# Patient Record
Sex: FEMALE | Race: WHITE | Employment: UNEMPLOYED | URBAN - METROPOLITAN AREA
[De-identification: names, ages, dates, MRNs, and addresses within clinical notes are randomized per-mention and may not be internally consistent; named-entity substitution may affect disease eponyms.]

---

## 2019-06-06 ENCOUNTER — TRANSFERRED RECORDS (OUTPATIENT)
Dept: HEALTH INFORMATION MANAGEMENT | Facility: CLINIC | Age: 39
End: 2019-06-06

## 2019-06-06 ENCOUNTER — HOSPITAL ENCOUNTER (INPATIENT)
Facility: CLINIC | Age: 39
LOS: 3 days | Discharge: HOME | End: 2019-06-09
Attending: SURGERY | Admitting: SURGERY
Payer: COMMERCIAL

## 2019-06-06 ENCOUNTER — ANESTHESIA EVENT (OUTPATIENT)
Dept: SURGERY | Facility: CLINIC | Age: 39
End: 2019-06-06
Payer: COMMERCIAL

## 2019-06-06 ENCOUNTER — ANESTHESIA (OUTPATIENT)
Dept: SURGERY | Facility: CLINIC | Age: 39
End: 2019-06-06
Payer: COMMERCIAL

## 2019-06-06 DIAGNOSIS — K35.32 ACUTE PERFORATED APPENDICITIS: Primary | ICD-10-CM

## 2019-06-06 LAB
ALT SERPL-CCNC: 28 U/L
AST SERPL-CCNC: 26 IU/L (ref 15–46)
CREAT SERPL-MCNC: 0.6 MG/DL (ref 0.52–1.04)
GFR SERPL CREATININE-BSD FRML MDRD: >60 ML/MIN/1.73M2
GLUCOSE SERPL-MCNC: 119 MG/DL (ref 74–106)
HCG UR QL: NEGATIVE
POTASSIUM SERPL-SCNC: 3.7 MMOL/L (ref 3.5–5.1)

## 2019-06-06 PROCEDURE — 36000056 ZZH SURGERY LEVEL 3 1ST 30 MIN: Performed by: SURGERY

## 2019-06-06 PROCEDURE — 88304 TISSUE EXAM BY PATHOLOGIST: CPT | Performed by: SURGERY

## 2019-06-06 PROCEDURE — 40000306 ZZH STATISTIC PRE PROC ASSESS II: Performed by: SURGERY

## 2019-06-06 PROCEDURE — 25000125 ZZHC RX 250: Performed by: NURSE ANESTHETIST, CERTIFIED REGISTERED

## 2019-06-06 PROCEDURE — 25000125 ZZHC RX 250: Performed by: SURGERY

## 2019-06-06 PROCEDURE — 12000013 ZZH R&B PEDS

## 2019-06-06 PROCEDURE — 81025 URINE PREGNANCY TEST: CPT | Performed by: ANESTHESIOLOGY

## 2019-06-06 PROCEDURE — 27210794 ZZH OR GENERAL SUPPLY STERILE: Performed by: SURGERY

## 2019-06-06 PROCEDURE — 58662 LAPAROSCOPY EXCISE LESIONS: CPT | Performed by: SURGERY

## 2019-06-06 PROCEDURE — 0U974ZZ DRAINAGE OF BILATERAL FALLOPIAN TUBES, PERCUTANEOUS ENDOSCOPIC APPROACH: ICD-10-PCS | Performed by: SURGERY

## 2019-06-06 PROCEDURE — 37000008 ZZH ANESTHESIA TECHNICAL FEE, 1ST 30 MIN: Performed by: SURGERY

## 2019-06-06 PROCEDURE — 25800025 ZZH RX 258: Performed by: SURGERY

## 2019-06-06 PROCEDURE — 71000012 ZZH RECOVERY PHASE 1 LEVEL 1 FIRST HR: Performed by: SURGERY

## 2019-06-06 PROCEDURE — 37000009 ZZH ANESTHESIA TECHNICAL FEE, EACH ADDTL 15 MIN: Performed by: SURGERY

## 2019-06-06 PROCEDURE — 25000128 H RX IP 250 OP 636: Performed by: SURGERY

## 2019-06-06 PROCEDURE — 0DTJ4ZZ RESECTION OF APPENDIX, PERCUTANEOUS ENDOSCOPIC APPROACH: ICD-10-PCS | Performed by: SURGERY

## 2019-06-06 PROCEDURE — 44970 LAPAROSCOPY APPENDECTOMY: CPT | Mod: 59 | Performed by: SURGERY

## 2019-06-06 PROCEDURE — 36000058 ZZH SURGERY LEVEL 3 EA 15 ADDTL MIN: Performed by: SURGERY

## 2019-06-06 PROCEDURE — 88304 TISSUE EXAM BY PATHOLOGIST: CPT | Mod: 26 | Performed by: SURGERY

## 2019-06-06 PROCEDURE — 25800030 ZZH RX IP 258 OP 636: Performed by: NURSE ANESTHETIST, CERTIFIED REGISTERED

## 2019-06-06 PROCEDURE — 25800030 ZZH RX IP 258 OP 636: Performed by: ANESTHESIOLOGY

## 2019-06-06 PROCEDURE — 25000128 H RX IP 250 OP 636: Performed by: NURSE ANESTHETIST, CERTIFIED REGISTERED

## 2019-06-06 PROCEDURE — 99221 1ST HOSP IP/OBS SF/LOW 40: CPT | Mod: 57 | Performed by: SURGERY

## 2019-06-06 RX ORDER — ONDANSETRON 4 MG/1
4 TABLET, ORALLY DISINTEGRATING ORAL EVERY 30 MIN PRN
Status: DISCONTINUED | OUTPATIENT
Start: 2019-06-06 | End: 2019-06-07 | Stop reason: HOSPADM

## 2019-06-06 RX ORDER — ONDANSETRON 2 MG/ML
INJECTION INTRAMUSCULAR; INTRAVENOUS PRN
Status: DISCONTINUED | OUTPATIENT
Start: 2019-06-06 | End: 2019-06-06

## 2019-06-06 RX ORDER — CEFOTETAN DISODIUM 2 G/20ML
2 INJECTION, POWDER, FOR SOLUTION INTRAMUSCULAR; INTRAVENOUS ONCE
Status: COMPLETED | OUTPATIENT
Start: 2019-06-06 | End: 2019-06-06

## 2019-06-06 RX ORDER — KETOROLAC TROMETHAMINE 30 MG/ML
INJECTION, SOLUTION INTRAMUSCULAR; INTRAVENOUS PRN
Status: DISCONTINUED | OUTPATIENT
Start: 2019-06-06 | End: 2019-06-06

## 2019-06-06 RX ORDER — LABETALOL 20 MG/4 ML (5 MG/ML) INTRAVENOUS SYRINGE
10
Status: DISCONTINUED | OUTPATIENT
Start: 2019-06-06 | End: 2019-06-07 | Stop reason: HOSPADM

## 2019-06-06 RX ORDER — NEOSTIGMINE METHYLSULFATE 1 MG/ML
VIAL (ML) INJECTION PRN
Status: DISCONTINUED | OUTPATIENT
Start: 2019-06-06 | End: 2019-06-06

## 2019-06-06 RX ORDER — HYDROMORPHONE HYDROCHLORIDE 1 MG/ML
.3-.5 INJECTION, SOLUTION INTRAMUSCULAR; INTRAVENOUS; SUBCUTANEOUS EVERY 5 MIN PRN
Status: DISCONTINUED | OUTPATIENT
Start: 2019-06-06 | End: 2019-06-07 | Stop reason: HOSPADM

## 2019-06-06 RX ORDER — SODIUM CHLORIDE, SODIUM LACTATE, POTASSIUM CHLORIDE, CALCIUM CHLORIDE 600; 310; 30; 20 MG/100ML; MG/100ML; MG/100ML; MG/100ML
500 INJECTION, SOLUTION INTRAVENOUS CONTINUOUS
Status: DISCONTINUED | OUTPATIENT
Start: 2019-06-06 | End: 2019-06-06 | Stop reason: HOSPADM

## 2019-06-06 RX ORDER — NALOXONE HYDROCHLORIDE 0.4 MG/ML
.1-.4 INJECTION, SOLUTION INTRAMUSCULAR; INTRAVENOUS; SUBCUTANEOUS
Status: CANCELLED | OUTPATIENT
Start: 2019-06-06 | End: 2019-06-07

## 2019-06-06 RX ORDER — MEPERIDINE HYDROCHLORIDE 50 MG/ML
12.5 INJECTION INTRAMUSCULAR; INTRAVENOUS; SUBCUTANEOUS EVERY 5 MIN PRN
Status: DISCONTINUED | OUTPATIENT
Start: 2019-06-06 | End: 2019-06-07 | Stop reason: HOSPADM

## 2019-06-06 RX ORDER — GLYCOPYRROLATE 0.2 MG/ML
INJECTION, SOLUTION INTRAMUSCULAR; INTRAVENOUS PRN
Status: DISCONTINUED | OUTPATIENT
Start: 2019-06-06 | End: 2019-06-06

## 2019-06-06 RX ORDER — FENTANYL CITRATE 50 UG/ML
25-50 INJECTION, SOLUTION INTRAMUSCULAR; INTRAVENOUS
Status: DISCONTINUED | OUTPATIENT
Start: 2019-06-06 | End: 2019-06-07 | Stop reason: HOSPADM

## 2019-06-06 RX ORDER — ONDANSETRON 2 MG/ML
4 INJECTION INTRAMUSCULAR; INTRAVENOUS EVERY 30 MIN PRN
Status: DISCONTINUED | OUTPATIENT
Start: 2019-06-06 | End: 2019-06-07 | Stop reason: HOSPADM

## 2019-06-06 RX ORDER — FENTANYL CITRATE 50 UG/ML
INJECTION, SOLUTION INTRAMUSCULAR; INTRAVENOUS PRN
Status: DISCONTINUED | OUTPATIENT
Start: 2019-06-06 | End: 2019-06-06

## 2019-06-06 RX ORDER — BUPIVACAINE HYDROCHLORIDE 5 MG/ML
INJECTION, SOLUTION EPIDURAL; INTRACAUDAL PRN
Status: DISCONTINUED | OUTPATIENT
Start: 2019-06-06 | End: 2019-06-07 | Stop reason: HOSPADM

## 2019-06-06 RX ORDER — PROPOFOL 10 MG/ML
INJECTION, EMULSION INTRAVENOUS PRN
Status: DISCONTINUED | OUTPATIENT
Start: 2019-06-06 | End: 2019-06-06

## 2019-06-06 RX ORDER — DIAZEPAM 10 MG/2ML
2.5 INJECTION, SOLUTION INTRAMUSCULAR; INTRAVENOUS
Status: DISCONTINUED | OUTPATIENT
Start: 2019-06-06 | End: 2019-06-07 | Stop reason: HOSPADM

## 2019-06-06 RX ORDER — LIDOCAINE HYDROCHLORIDE 10 MG/ML
INJECTION, SOLUTION INFILTRATION; PERINEURAL PRN
Status: DISCONTINUED | OUTPATIENT
Start: 2019-06-06 | End: 2019-06-06

## 2019-06-06 RX ORDER — DEXAMETHASONE SODIUM PHOSPHATE 4 MG/ML
INJECTION, SOLUTION INTRA-ARTICULAR; INTRALESIONAL; INTRAMUSCULAR; INTRAVENOUS; SOFT TISSUE PRN
Status: DISCONTINUED | OUTPATIENT
Start: 2019-06-06 | End: 2019-06-06

## 2019-06-06 RX ORDER — SODIUM CHLORIDE, SODIUM LACTATE, POTASSIUM CHLORIDE, CALCIUM CHLORIDE 600; 310; 30; 20 MG/100ML; MG/100ML; MG/100ML; MG/100ML
INJECTION, SOLUTION INTRAVENOUS CONTINUOUS
Status: DISCONTINUED | OUTPATIENT
Start: 2019-06-06 | End: 2019-06-07 | Stop reason: HOSPADM

## 2019-06-06 RX ORDER — ALBUTEROL SULFATE 0.83 MG/ML
2.5 SOLUTION RESPIRATORY (INHALATION) EVERY 4 HOURS PRN
Status: DISCONTINUED | OUTPATIENT
Start: 2019-06-06 | End: 2019-06-07 | Stop reason: HOSPADM

## 2019-06-06 RX ORDER — DIMENHYDRINATE 50 MG/ML
25 INJECTION, SOLUTION INTRAMUSCULAR; INTRAVENOUS
Status: DISCONTINUED | OUTPATIENT
Start: 2019-06-06 | End: 2019-06-07 | Stop reason: HOSPADM

## 2019-06-06 RX ORDER — HYDRALAZINE HYDROCHLORIDE 20 MG/ML
2.5-5 INJECTION INTRAMUSCULAR; INTRAVENOUS EVERY 10 MIN PRN
Status: DISCONTINUED | OUTPATIENT
Start: 2019-06-06 | End: 2019-06-07 | Stop reason: HOSPADM

## 2019-06-06 RX ADMIN — ONDANSETRON 4 MG: 2 INJECTION INTRAMUSCULAR; INTRAVENOUS at 22:00

## 2019-06-06 RX ADMIN — PHENYLEPHRINE HYDROCHLORIDE 100 MCG: 10 INJECTION INTRAVENOUS at 22:18

## 2019-06-06 RX ADMIN — CEFOTETAN DISODIUM 2 G: 2 INJECTION, POWDER, FOR SOLUTION INTRAMUSCULAR; INTRAVENOUS at 22:16

## 2019-06-06 RX ADMIN — KETOROLAC TROMETHAMINE 30 MG: 30 INJECTION, SOLUTION INTRAMUSCULAR at 22:00

## 2019-06-06 RX ADMIN — SODIUM CHLORIDE, SODIUM LACTATE, POTASSIUM CHLORIDE, CALCIUM CHLORIDE: 600; 310; 30; 20 INJECTION, SOLUTION INTRAVENOUS at 21:55

## 2019-06-06 RX ADMIN — DEXAMETHASONE SODIUM PHOSPHATE 8 MG: 4 INJECTION, SOLUTION INTRA-ARTICULAR; INTRALESIONAL; INTRAMUSCULAR; INTRAVENOUS; SOFT TISSUE at 22:00

## 2019-06-06 RX ADMIN — Medication 3 MG: at 22:51

## 2019-06-06 RX ADMIN — PHENYLEPHRINE HYDROCHLORIDE 200 MCG: 10 INJECTION INTRAVENOUS at 22:11

## 2019-06-06 RX ADMIN — HYDROMORPHONE HYDROCHLORIDE 1 MG: 1 INJECTION, SOLUTION INTRAMUSCULAR; INTRAVENOUS; SUBCUTANEOUS at 22:00

## 2019-06-06 RX ADMIN — GLYCOPYRROLATE 0.4 MG: 0.2 INJECTION, SOLUTION INTRAMUSCULAR; INTRAVENOUS at 22:51

## 2019-06-06 RX ADMIN — ROCURONIUM BROMIDE 30 MG: 10 INJECTION INTRAVENOUS at 22:00

## 2019-06-06 RX ADMIN — FENTANYL CITRATE 100 MCG: 50 INJECTION, SOLUTION INTRAMUSCULAR; INTRAVENOUS at 22:00

## 2019-06-06 RX ADMIN — LIDOCAINE HYDROCHLORIDE 50 MG: 10 INJECTION, SOLUTION INFILTRATION; PERINEURAL at 22:00

## 2019-06-06 RX ADMIN — GLYCOPYRROLATE 0.2 MG: 0.2 INJECTION, SOLUTION INTRAMUSCULAR; INTRAVENOUS at 22:00

## 2019-06-07 LAB
CREAT SERPL-MCNC: 0.64 MG/DL (ref 0.52–1.04)
GFR SERPL CREATININE-BSD FRML MDRD: >90 ML/MIN/{1.73_M2}
GLUCOSE SERPL-MCNC: 159 MG/DL (ref 70–99)
PLATELET # BLD AUTO: 191 10E9/L (ref 150–450)

## 2019-06-07 PROCEDURE — 25000132 ZZH RX MED GY IP 250 OP 250 PS 637: Performed by: PHYSICIAN ASSISTANT

## 2019-06-07 PROCEDURE — 25800030 ZZH RX IP 258 OP 636: Performed by: SURGERY

## 2019-06-07 PROCEDURE — 82947 ASSAY GLUCOSE BLOOD QUANT: CPT | Performed by: SURGERY

## 2019-06-07 PROCEDURE — 82565 ASSAY OF CREATININE: CPT | Performed by: SURGERY

## 2019-06-07 PROCEDURE — 12000013 ZZH R&B PEDS

## 2019-06-07 PROCEDURE — 25000132 ZZH RX MED GY IP 250 OP 250 PS 637: Performed by: SURGERY

## 2019-06-07 PROCEDURE — 85049 AUTOMATED PLATELET COUNT: CPT | Performed by: SURGERY

## 2019-06-07 PROCEDURE — 36415 COLL VENOUS BLD VENIPUNCTURE: CPT | Performed by: SURGERY

## 2019-06-07 PROCEDURE — 25000128 H RX IP 250 OP 636: Performed by: SURGERY

## 2019-06-07 RX ORDER — DIPHENHYDRAMINE HYDROCHLORIDE 50 MG/ML
25 INJECTION INTRAMUSCULAR; INTRAVENOUS EVERY 6 HOURS PRN
Status: DISCONTINUED | OUTPATIENT
Start: 2019-06-07 | End: 2019-06-09 | Stop reason: HOSPADM

## 2019-06-07 RX ORDER — HYDROMORPHONE HYDROCHLORIDE 1 MG/ML
.3-.5 INJECTION, SOLUTION INTRAMUSCULAR; INTRAVENOUS; SUBCUTANEOUS
Status: DISCONTINUED | OUTPATIENT
Start: 2019-06-07 | End: 2019-06-09 | Stop reason: HOSPADM

## 2019-06-07 RX ORDER — IBUPROFEN 600 MG/1
600 TABLET, FILM COATED ORAL EVERY 6 HOURS PRN
Status: DISCONTINUED | OUTPATIENT
Start: 2019-06-07 | End: 2019-06-09 | Stop reason: HOSPADM

## 2019-06-07 RX ORDER — DEXTROSE MONOHYDRATE, SODIUM CHLORIDE, AND POTASSIUM CHLORIDE 50; 1.49; 4.5 G/1000ML; G/1000ML; G/1000ML
INJECTION, SOLUTION INTRAVENOUS CONTINUOUS
Status: DISCONTINUED | OUTPATIENT
Start: 2019-06-07 | End: 2019-06-09 | Stop reason: HOSPADM

## 2019-06-07 RX ORDER — DIPHENHYDRAMINE HCL 25 MG
25 CAPSULE ORAL EVERY 6 HOURS PRN
Status: DISCONTINUED | OUTPATIENT
Start: 2019-06-07 | End: 2019-06-09 | Stop reason: HOSPADM

## 2019-06-07 RX ORDER — ONDANSETRON 4 MG/1
4 TABLET, ORALLY DISINTEGRATING ORAL EVERY 6 HOURS PRN
Status: DISCONTINUED | OUTPATIENT
Start: 2019-06-07 | End: 2019-06-09 | Stop reason: HOSPADM

## 2019-06-07 RX ORDER — ACETAMINOPHEN 325 MG/1
975 TABLET ORAL EVERY 6 HOURS PRN
Status: DISCONTINUED | OUTPATIENT
Start: 2019-06-07 | End: 2019-06-09 | Stop reason: HOSPADM

## 2019-06-07 RX ORDER — ACETAMINOPHEN 325 MG/1
975 TABLET ORAL EVERY 8 HOURS
Status: DISCONTINUED | OUTPATIENT
Start: 2019-06-07 | End: 2019-06-07

## 2019-06-07 RX ORDER — LIDOCAINE 40 MG/G
CREAM TOPICAL
Status: DISCONTINUED | OUTPATIENT
Start: 2019-06-07 | End: 2019-06-09 | Stop reason: HOSPADM

## 2019-06-07 RX ORDER — OXYCODONE HYDROCHLORIDE 5 MG/1
5-10 TABLET ORAL
Status: DISCONTINUED | OUTPATIENT
Start: 2019-06-07 | End: 2019-06-09 | Stop reason: HOSPADM

## 2019-06-07 RX ORDER — ACETAMINOPHEN 325 MG/1
650 TABLET ORAL EVERY 4 HOURS PRN
Status: DISCONTINUED | OUTPATIENT
Start: 2019-06-09 | End: 2019-06-07 | Stop reason: DRUGHIGH

## 2019-06-07 RX ORDER — NALOXONE HYDROCHLORIDE 0.4 MG/ML
.1-.4 INJECTION, SOLUTION INTRAMUSCULAR; INTRAVENOUS; SUBCUTANEOUS
Status: DISCONTINUED | OUTPATIENT
Start: 2019-06-07 | End: 2019-06-09 | Stop reason: HOSPADM

## 2019-06-07 RX ORDER — PROCHLORPERAZINE MALEATE 10 MG
10 TABLET ORAL EVERY 6 HOURS PRN
Status: DISCONTINUED | OUTPATIENT
Start: 2019-06-07 | End: 2019-06-09 | Stop reason: HOSPADM

## 2019-06-07 RX ORDER — ONDANSETRON 2 MG/ML
4 INJECTION INTRAMUSCULAR; INTRAVENOUS EVERY 6 HOURS PRN
Status: DISCONTINUED | OUTPATIENT
Start: 2019-06-07 | End: 2019-06-09 | Stop reason: HOSPADM

## 2019-06-07 RX ORDER — MULTIPLE VITAMINS W/ MINERALS TAB 9MG-400MCG
1 TAB ORAL DAILY
COMMUNITY

## 2019-06-07 RX ADMIN — IBUPROFEN 600 MG: 600 TABLET ORAL at 19:57

## 2019-06-07 RX ADMIN — POTASSIUM CHLORIDE, DEXTROSE MONOHYDRATE AND SODIUM CHLORIDE: 150; 5; 450 INJECTION, SOLUTION INTRAVENOUS at 12:47

## 2019-06-07 RX ADMIN — ACETAMINOPHEN 975 MG: 325 TABLET, FILM COATED ORAL at 01:23

## 2019-06-07 RX ADMIN — ACETAMINOPHEN 975 MG: 325 TABLET, FILM COATED ORAL at 08:54

## 2019-06-07 RX ADMIN — TAZOBACTAM SODIUM AND PIPERACILLIN SODIUM 3.38 G: 375; 3 INJECTION, SOLUTION INTRAVENOUS at 08:02

## 2019-06-07 RX ADMIN — ACETAMINOPHEN 975 MG: 325 TABLET, FILM COATED ORAL at 14:41

## 2019-06-07 RX ADMIN — POTASSIUM CHLORIDE, DEXTROSE MONOHYDRATE AND SODIUM CHLORIDE: 150; 5; 450 INJECTION, SOLUTION INTRAVENOUS at 01:17

## 2019-06-07 RX ADMIN — ACETAMINOPHEN 975 MG: 325 TABLET, FILM COATED ORAL at 20:54

## 2019-06-07 RX ADMIN — TAZOBACTAM SODIUM AND PIPERACILLIN SODIUM 3.38 G: 375; 3 INJECTION, SOLUTION INTRAVENOUS at 02:24

## 2019-06-07 RX ADMIN — TAZOBACTAM SODIUM AND PIPERACILLIN SODIUM 3.38 G: 375; 3 INJECTION, SOLUTION INTRAVENOUS at 14:01

## 2019-06-07 RX ADMIN — HYDROMORPHONE HYDROCHLORIDE 0.5 MG: 1 INJECTION, SOLUTION INTRAMUSCULAR; INTRAVENOUS; SUBCUTANEOUS at 05:42

## 2019-06-07 RX ADMIN — ENOXAPARIN SODIUM 40 MG: 40 INJECTION SUBCUTANEOUS at 20:12

## 2019-06-07 RX ADMIN — TAZOBACTAM SODIUM AND PIPERACILLIN SODIUM 3.38 G: 375; 3 INJECTION, SOLUTION INTRAVENOUS at 20:12

## 2019-06-07 ASSESSMENT — MIFFLIN-ST. JEOR: SCORE: 1345.39

## 2019-06-07 NOTE — ANESTHESIA PREPROCEDURE EVALUATION
"Anesthesia Pre-Procedure Evaluation    Patient: Omer Quezada   MRN: [id number] : 1980          Preoperative Diagnosis: appendix    Procedure(s):  APPENDECTOMY, LAPAROSCOPIC    No past medical history on file.  Past Surgical History:   Procedure Laterality Date     wisdom teeth[  2004     Anesthesia Evaluation     . Pt has had prior anesthetic. Type: MAC    No history of anesthetic complications          ROS/MED HX    ENT/Pulmonary:  - neg pulmonary ROS     Neurologic:  - neg neurologic ROS     Cardiovascular:  - neg cardiovascular ROS       METS/Exercise Tolerance:     Hematologic:  - neg hematologic  ROS       Musculoskeletal:  - neg musculoskeletal ROS       GI/Hepatic:     (+) appendicitis,       Renal/Genitourinary:  - ROS Renal section negative       Endo:  - neg endo ROS       Psychiatric:  - neg psychiatric ROS       Infectious Disease:  - neg infectious disease ROS       Malignancy:      - no malignancy   Other:    - neg other ROS                      Physical Exam  Normal systems: cardiovascular, pulmonary and dental    Airway   Mallampati: II  TM distance: >3 FB  Neck ROM: full    Dental     Cardiovascular   Rhythm and rate: regular and normal      Pulmonary    breath sounds clear to auscultation            No results found for: WBC, HGB, HCT, PLT, CRP, SED, NA, POTASSIUM, CHLORIDE, CO2, BUN, CR, GLC, LEIDA, PHOS, MAG, ALBUMIN, PROTTOTAL, ALT, AST, GGT, ALKPHOS, BILITOTAL, BILIDIRECT, LIPASE, AMYLASE, ALEYDA, PTT, INR, FIBR, TSH, T4, T3, HCG, HCGS, CKTOTAL, CKMB, TROPN    Preop Vitals  BP Readings from Last 3 Encounters:   13 97/53    Pulse Readings from Last 3 Encounters:   13 70   06/28/10 68      Resp Readings from Last 3 Encounters:   13 16    SpO2 Readings from Last 3 Encounters:   No data found for SpO2      Temp Readings from Last 1 Encounters:   13 98.3  F (36.8  C) (Oral)    Ht Readings from Last 1 Encounters:   13 1.676 m (5' 6\")      Wt Readings from Last 1 " "Encounters:   04/29/13 70.3 kg (155 lb)    Estimated body mass index is 25.02 kg/m  as calculated from the following:    Height as of 4/29/13: 1.676 m (5' 6\").    Weight as of 4/29/13: 70.3 kg (155 lb).       Anesthesia Plan      History & Physical Review  History and physical reviewed and following examination; no interval change.    ASA Status:  1 emergent.    NPO Status:  > 8 hours    Plan for General and ETT with Intravenous induction. Maintenance will be Balanced.    PONV prophylaxis:  Ondansetron (or other 5HT-3) and Dexamethasone or Solumedrol       Postoperative Care  Postoperative pain management:  IV analgesics, Oral pain medications and Multi-modal analgesia.      Consents  Anesthetic plan, risks, benefits and alternatives discussed with:  Patient.  Use of blood products discussed: No .   .                 Kevin Barragan MD                    .  "

## 2019-06-07 NOTE — PLAN OF CARE
Arrived to Peds from PACU at 0100 following appendectomy.  VSS, afebrile.  O2 sats stable on RA.  Abdomen soft, rounded.  Bowel sounds absent.  Denies nausea.  Pain well managed with scheduled acetaminophen and ice.  Dilaudid given this AM prior to first time getting out of bed.  Tolerated ambulating to bathroom.  Voided a small amount despite not feeling the need to void.  Incisions with steri strips clean, dry, and intact.   at bedside and supportive.

## 2019-06-07 NOTE — PHARMACY-ADMISSION MEDICATION HISTORY
Admission medication history interview status for this patient is complete. See [facility] admission navigator for allergy information, prior to admission medications and immunization status.     Medication history interview source(s):Patient  Medication history resources (including written lists, pill bottles, clinic record):None    Changes made to PTA medication list:  Added: multivitamin  Deleted: docusate, ibuprofen, polytrim, prenatal  Changed: none    Actions taken by pharmacist (provider contacted, etc):None     Additional medication history information:None    Medication reconciliation/reorder completed by provider prior to medication history? No    Do you take OTC medications (eg tylenol, ibuprofen, fish oil, eye/ear drops, etc)? Y(Y/N)    For patients on insulin therapy: N (Y/N)  Lantus/levemir/NPH/Mix 70/30 dose:   (Y/N) (see Med list for doses)   Sliding scale Novolog Y/N  If Yes, do you have a baseline novolog pre-meal dose:  units with meals  Patients eat three meals a day:   Y/N    How many episodes of hypoglycemia do you have per week: _______  How many missed doses do you have per week: ______  How many times do you check your blood glucose per day: _______  Do you have a Continuous glucose monitor (CGM)   Y/N (remind pt that not approved for hospital use)   Any Barriers to therapy - Be specific :  cost of medications, comfortable with giving injections (if applicable), comfortable and confident with current diabetes regimen: Y/N ______________      Prior to Admission medications    Medication Sig Last Dose Taking? Auth Provider   multivitamin w/minerals (THERA-VIT-M) tablet Take 1 tablet by mouth daily  Yes Unknown, Entered By History

## 2019-06-07 NOTE — ANESTHESIA POSTPROCEDURE EVALUATION
Patient: Omer Quezada    Procedure(s):  APPENDECTOMY, LAPAROSCOPIC drainage of right fallopian tube cyst    Diagnosis:appendix  Diagnosis Additional Information: Appendicitis and fallopian tube cysts  Dr. Varner    Anesthesia Type:  General, ETT    Note:  Anesthesia Post Evaluation    Patient location during evaluation: PACU  Patient participation: Able to fully participate in evaluation  Level of consciousness: awake  Pain management: adequate  Airway patency: patent  Cardiovascular status: acceptable  Respiratory status: acceptable  Hydration status: acceptable  PONV: none             Last vitals:  Vitals:    06/06/19 2308 06/06/19 2310 06/06/19 2315   BP: 102/56 (!) 88/52 91/49   Pulse: 98 90 92   Resp: 17 17 12   Temp: 98.9  F (37.2  C)     SpO2: 100% 100% 100%         Electronically Signed By: Kevin Barragan MD  June 6, 2019  11:31 PM

## 2019-06-07 NOTE — H&P
[facility]  [facility] - H&P     Omer Quezada MRN# [id number]   Age: 39 year old YOB: 1980     HPI:  Patient has been experiencing acute lower abdominal pain for the past day.  This became quite severe earlier today, and the patient presented to the urgency room.  CT scan showed a dilated appendix concerning for acute appendicitis.  Other potential causes for inflammation were also felt to be possible.  History is obtained from the patient    Review Of Systems:  Respiratory: No shortness of breath, dyspnea on exertion, cough, or hemoptysis  Cardiovascular: negative  Gastrointestinal: as above  Genitourinary: negative  All remaining review of systems negative except as stated in HPI.    PMH:  No significant past medical history.    PSH:  Past Surgical History:   Procedure Laterality Date     wisdom teeth[  2004       Allergies:  No Known Allergies    Home Medications:  Current Outpatient Medications   Medication Sig Dispense Refill     docusate sodium (COLACE) 100 MG capsule Take 1 capsule by mouth 2 times daily. 60 capsule 0     ibuprofen (ADVIL,MOTRIN) 400-800 mg tablet Take 2 tablets by mouth every 8 hours as needed (cramping). 40 tablet 0     POLYTRIM 40820-6.1 UNIT/ML-% OP SOLN PUT 1-2 DROPS IN AFFECTED EYE EVERY 4-6 HOURS 10 mL 0     PRENATAL VITAMINS PO None Entered         Social History:  Social History     Tobacco Use     Smoking status: Never Smoker   Substance Use Topics     Alcohol use: Not on file     Drug use: Not on file       Family History:  Family history reviewed.  No pertinent family history.    Physical Exam:      General appearance: healthy, alert and mild distress.  Hydration: well hydrated  HEENT: normocephalic, atraumatic  Neck: no adenopathy  Lungs: normal and clear to auscultation  Heart: regular rate and rhythm and no murmurs, clicks, or gallops  Abdomen: flat, normal bowel sounds and hypoactive bowel sounds. Tenderness: present: RLQ and LLQ  marked.  Masses: none.  Organomegaly: none  Skin: clear without rashes/lesions  Extremities: no gross deformities  Neuro: oriented to time & place, moves all extremities with normal strength, speech clear    Labs Reviewed:  Blood cell count is 11,300.  Hemoglobin is 13.3.  Urine pregnancy test is negative.      Radiology:  CT abdomen reveals a dilated, thick-walled appendix measuring 10.5 mm, but without much surrounding fat.  There is some fluid in the pelvis and some inflammatory appearing change consistent with peritonitis.   All imaging studies reviewed by me.    ASSESSMENT/PLAN:  The patient's history, physical exam, laboratory and imaging studies are suspicious for acute appendicitis.  Another possibility would be a ruptured ovarian cyst.  I have offered the patient laparoscopic appendectomy.  The risks, benefits, and alternatives have been discussed in detail.  All of the patient's questions have been answered.  They elect to proceed and we will go to the OR at the soonest availability.  The patient understands that there is a possibility that this is not acute appendicitis.  Pre-operative antibiotics have been given at the outside emergency room.    Dino Varner MD

## 2019-06-07 NOTE — PROGRESS NOTES
[facility]   General Surgery Progress Note          Assessment and Plan:   Assessment:   POD#1 s/p Procedure(s):  APPENDECTOMY, LAPAROSCOPIC drainage of right fallopian tube cyst  Postoperative ileus as expected      Plan:   -Diet: sips of clears  -Continue IV ABX: Zosyn  -VTE PPX: Lovenox, PCDs  -Pain Mgmt: Tylenol scheduled, IV dilaudid prn  -Increase activity as tolerated  -Await return of bowel function         Interval History:   Resting in bed, feeling better but tired. Denies pain at rest. Has been up to restroom, voiding independently. No flatus yet. No appetite. Tolerating ice chips so far.          Physical Exam:   Blood pressure 101/64, pulse 92, temperature 97.8  F (36.6  C), temperature source Oral, resp. rate 16, last menstrual period 05/20/2019, SpO2 98 %, unknown if currently breastfeeding.    I/O last 3 completed shifts:  In: 1561.67 [I.V.:1561.67]  Out: 55 [Urine:50; Blood:5]    Abdomen: soft, ND, +RLQ tenderness, hypoactive BS  Inc(s) - clean, dry, intact with steristrips, no erythema            Data:   No results for input(s): HGB, WBC in the last 50907 hours.         Stef Stephenson PA-C    Seen and agree,    Dino Varner MD  [facility]

## 2019-06-07 NOTE — ANESTHESIA CARE TRANSFER NOTE
Patient: Omer Quezada    Procedure(s):  APPENDECTOMY, LAPAROSCOPIC drainage of right fallopian tube cyst    Diagnosis: appendix  Diagnosis Additional Information: No value filed.    Anesthesia Type:   General, ETT     Note:  Airway :Face Mask  Patient transferred to:PACU  Comments: Report and signed off to RN in PACU.  Good Resps, skin pink, VSS, O2 via face tent.Handoff Report: Allowed opportunity for questions and acknowledgement of understanding      Vitals: (Last set prior to Anesthesia Care Transfer)    CRNA VITALS  6/6/2019 2233 - 6/6/2019 2312      6/6/2019             Pulse:  110    SpO2:  100 %                Electronically Signed By: STEVIE Cool CRNA  June 6, 2019  11:12 PM

## 2019-06-07 NOTE — OP NOTE
General Surgery Operative Note    Pre-operative diagnosis:  appendicitis   Post-operative diagnosis: Perforated appendicitis, bilateral fallopian tube cysts with partial torsion on the right.   Procedure: Laparoscopic Appendectomy, drainage of bilateral fallopian tube cysts.   Surgeon: Dino Varner MD   Assistant(s): NONE   Anesthesia: General    Estimated blood loss: 5 cc's   Drains placed: None   Complications:  None   Findings:   Perforated appendix with significant surrounding peritonitis.  There was a 5 cm cyst coming from the distal right fallopian tube.  This had twisted approximately 270 degrees.  There was no obvious ischemia.  This was drained to reduce the risk of torsion.  The left side revealed a small fallopian tube cyst adherent to the omentum and causing the tissue band from the left lower quadrant to the omentum.  This was divided to prevent future obstruction.  The cyst was drained.     Indications for operation: This is a 39-year-old woman who presents with a 1 day history of lower abdominal pain.  She was seen at an outside emergency room and a CT scan was performed.  This showed fluid in the pelvis and a prominent appendix concerning for possible appendicitis.  Patient was transferred here to the hospital for appendectomy.  We discussed the procedure, along with its risks and complications, in detail.  We discussed the possibility that the appendix was not the primary problem.    Details of the operation: After informed consent, the patient was taken to the operating where she underwent satisfactory induction of general anesthesia.  The patient was sterilely prepped and draped and a supraumbilical skin incision was made.  Dissection was carried bluntly down to the fascia, which was opened using electrocautery.  The Kaur trocar was introduced and fixed in place using stay sutures.  Pneumoperitoneum was achieved using CO2 insufflation, and, under direct visualization, 2 lower abdominal 5 mm  ports were placed.  There was obvious inflammatory change in the lower abdomen and pelvis.  Inflammatory adhesions were bluntly taken down, revealing the base of the appendix.  This was followed down towards its tip, where there was an obvious perforation and drainage from the tip of the appendix.  A window was made in the mesoappendix and an Endo BULMARO stapler was fired across the cecum just below the base of the appendix.  A vascular load was used to divide the mesoappendix.  The appendix was then placed in an Endo Catch bag and brought out through the supraumbilical incision.  The area of the appendix was now irrigated out.  Hemostasis was assured using electrocautery.  The right ovary was visualized and appeared normal.  The fallopian tube was very elongated and at its and had a 5 cm cyst which was twisted about 270 degrees.  There was no obvious ischemic change.  I discussed this intraoperatively with Dr. Day of GYN.  It was felt that this would most appropriately be drained to reduce the risk of future torsion.  This was performed using electrocautery and the cyst was entirely drained.  The left ovary was now visualized and appeared normal.  The left fallopian tube was extremely elongated and was attached to the omentum on the left.  At the very tip, there was a 1.5 cm cyst.  This was drained in the attachment from the omentum to the fallopian tube was divided using electrocautery, as this formed a potential obstructive band.  This allowed full release of the omentum.  The abdomen was irrigated out using copious amounts of normal saline.  The trocar sites were infiltrated with 0.5% Marcaine and the trochars were removed under direct visualization.  The supraumbilical fascia was closed using interrupted 0 Vicryl sutures and skin incisions were closed using 4-0 subcuticular Vicryl followed by Steri-Strips.    The patient tolerated the procedure well and was transferred to the recovery room in satisfactory  condition.  Sponge and needle counts were correct at the close of the case.    Specimens:   ID Type Source Tests Collected by Time Destination   A : appendix Tissue Appendix SURGICAL PATHOLOGY EXAM Dino Varner MD 6/6/2019 10:29 PM            Dino Varner MD

## 2019-06-08 LAB — GLUCOSE BLDC GLUCOMTR-MCNC: 82 MG/DL (ref 70–99)

## 2019-06-08 PROCEDURE — 25000128 H RX IP 250 OP 636: Performed by: SURGERY

## 2019-06-08 PROCEDURE — 25000132 ZZH RX MED GY IP 250 OP 250 PS 637: Performed by: SURGERY

## 2019-06-08 PROCEDURE — 25000132 ZZH RX MED GY IP 250 OP 250 PS 637: Performed by: PHYSICIAN ASSISTANT

## 2019-06-08 PROCEDURE — 00000146 ZZHCL STATISTIC GLUCOSE BY METER IP

## 2019-06-08 PROCEDURE — 12000013 ZZH R&B PEDS

## 2019-06-08 RX ADMIN — TAZOBACTAM SODIUM AND PIPERACILLIN SODIUM 3.38 G: 375; 3 INJECTION, SOLUTION INTRAVENOUS at 07:53

## 2019-06-08 RX ADMIN — IBUPROFEN 600 MG: 600 TABLET ORAL at 23:29

## 2019-06-08 RX ADMIN — IBUPROFEN 600 MG: 600 TABLET ORAL at 12:39

## 2019-06-08 RX ADMIN — TAZOBACTAM SODIUM AND PIPERACILLIN SODIUM 3.38 G: 375; 3 INJECTION, SOLUTION INTRAVENOUS at 21:16

## 2019-06-08 RX ADMIN — ACETAMINOPHEN 975 MG: 325 TABLET, FILM COATED ORAL at 07:52

## 2019-06-08 RX ADMIN — TAZOBACTAM SODIUM AND PIPERACILLIN SODIUM 3.38 G: 375; 3 INJECTION, SOLUTION INTRAVENOUS at 01:07

## 2019-06-08 RX ADMIN — ACETAMINOPHEN 975 MG: 325 TABLET, FILM COATED ORAL at 17:49

## 2019-06-08 RX ADMIN — TAZOBACTAM SODIUM AND PIPERACILLIN SODIUM 3.38 G: 375; 3 INJECTION, SOLUTION INTRAVENOUS at 15:29

## 2019-06-08 NOTE — PLAN OF CARE
VSS.  Pain controlled with po tylenol, ibuprofen and abdominal binder.  Tolerating full liquids.  Ambulating independently in halls.  Active BS and passing flatus.  Incisions C/D/I.  Showered.  Continues to receive IV antibiotics.

## 2019-06-08 NOTE — PLAN OF CARE
Orientation: Alert and oriented x4, anxious  VSS. 100% on RA.   Tele: N/A. HR 65.   LS: Clear, denies SOB/CARABALLO  GI: BS audible/hypoactive x4, tolerating CLD, Abdominal discomfort. Passing gas. No BM this shift. Denies N/V.   : Adequate urine output. Yes  Skin: 3 lap sites to abdomen covered with steri strips, dried drainage.  Activity: SBA. Pt slept comfortably throughout shift.   Pain: 1/10 abdominal pain, declined pain interventions.  Plan: Pain control, supportive cares, IV abx, advance diet, discharge TBD. Continue with current cares.

## 2019-06-08 NOTE — PLAN OF CARE
Vitals stable and afebrile. 3 lap sites clean dry and intact. Tolerating clear diet. Up to bathroom with assist of one and amb in [place] with stand by assist. Continues on iv antibiotic. Pain eased with po meds, rates pain 1 at rest and 4 when up moving. Using abominal binder, doing IS and TCDB hourly.

## 2019-06-08 NOTE — PROGRESS NOTES
"[facility]   General Surgery Progress Note          Assessment and Plan:   Assessment:   POD#2 s/p Procedure(s):  APPENDECTOMY, LAPAROSCOPIC drainage of right fallopian tube cyst  Perforated appendicitis  Postoperative ileus as expected      Plan:   -ADAT  -Continue IV ABX: Zosyn  -VTE PPX: Lovenox, PCDs  -Pain Mgmt: Tylenol and Ibuprofen.  Oxycodone available.  -discharge tomorrow if remains afebrile and tolerates diet advancement         Interval History:   Afebrile.  Feels fine, pain controlled with Tylenol and Ibuprofen, not taking narcotics.  Up walking halls frequently, voiding normally.  + passing flatus this am.  Denies bloating.         Physical Exam:   Blood pressure 104/62, pulse 92, temperature 98.1  F (36.7  C), temperature source Oral, resp. rate 16, height 1.676 m (5' 6\"), weight 64.9 kg (143 lb), last menstrual period 05/20/2019, SpO2 100 %, unknown if currently breastfeeding.    I/O last 3 completed shifts:  In: 1435 [P.O.:720; I.V.:715]  Out: 1400 [Urine:1400]    Abdomen: soft, ND, +RLQ tenderness, hypoactive BS  Inc(s) - clean, dry, intact with steristrips, no erythema            Data:   No results for input(s): HGB, WBC in the last 28778 hours.         Maribel Nickerson PA-C    Seen and agree,    Dino Varner MD  [facility]    "

## 2019-06-09 VITALS
SYSTOLIC BLOOD PRESSURE: 97 MMHG | WEIGHT: 143 LBS | HEIGHT: 66 IN | BODY MASS INDEX: 22.98 KG/M2 | OXYGEN SATURATION: 96 % | TEMPERATURE: 98.9 F | HEART RATE: 92 BPM | RESPIRATION RATE: 15 BRPM | DIASTOLIC BLOOD PRESSURE: 59 MMHG

## 2019-06-09 LAB — PLATELET # BLD AUTO: 190 10E9/L (ref 150–450)

## 2019-06-09 PROCEDURE — 25000128 H RX IP 250 OP 636: Performed by: SURGERY

## 2019-06-09 PROCEDURE — 85049 AUTOMATED PLATELET COUNT: CPT | Performed by: SURGERY

## 2019-06-09 PROCEDURE — 36415 COLL VENOUS BLD VENIPUNCTURE: CPT | Performed by: SURGERY

## 2019-06-09 PROCEDURE — 25000132 ZZH RX MED GY IP 250 OP 250 PS 637: Performed by: PHYSICIAN ASSISTANT

## 2019-06-09 RX ADMIN — TAZOBACTAM SODIUM AND PIPERACILLIN SODIUM 3.38 G: 375; 3 INJECTION, SOLUTION INTRAVENOUS at 08:30

## 2019-06-09 RX ADMIN — ACETAMINOPHEN 975 MG: 325 TABLET, FILM COATED ORAL at 08:27

## 2019-06-09 RX ADMIN — TAZOBACTAM SODIUM AND PIPERACILLIN SODIUM 3.38 G: 375; 3 INJECTION, SOLUTION INTRAVENOUS at 03:59

## 2019-06-09 NOTE — PLAN OF CARE
Pt tolerating regular diet in small/frequent amounts, ambulating frequently around hospital, c/o pain at IV site which decreased after fluids were infusing, continue to monitor and provide for needs.

## 2019-06-09 NOTE — PLAN OF CARE
Vital Signs:VSS, afebrile  Pain/Comfort: Motrin given with relief  Assessment: Hypoactive bowel sounds, lungs clear, passing flatus, abdomen rounded  Diet: regular diet  Output:voiding and no stool as yet  Activity/Ambulation: resting in bed and up to bathroom x 2  Social: No visitors here tonight  Plan: Will continue  to monitor closely and provide for needs, medicate for pain

## 2019-06-09 NOTE — DISCHARGE INSTRUCTIONS
HOME CARE FOLLOWING APPENDECTOMY  HEBERT Mills, KEVIN Howard R. O Donnell, J. Shaheen    INCISIONAL CARE:  Replace the bandage over your incision (or incisions) until all drainage stops, or if more comfortable to have in place.  If present, leave the steri-strips (white paper tapes) in place for 14 days after surgery.  If you have staples in your incision at the time of discharge, they will be removed at your follow-up appointment.  If Dermabond (a type of skin glue) is present, leave in place until it wears/flakes off.     BATHING:  Avoid baths for 1 week after surgery.  Showers are okay.  You may wash your hair at any time.  Gently pat your incision dry after bathing.    ACTIVITY:  Light Activity -- you may immediately be up and about as tolerated.  Driving -- you may drive when comfortable and off narcotic pain medications.  Light Work -- resume when comfortable off pain medications.  (If you can drive, you probably can work.)  Strenuous Work/Activity -- limit lifting to 20 pounds for 2 weeks.  Progressively increase with time.  Active Sports (running, biking, etc.) -- cautiously resume after 2 weeks.    DISCOMFORT:  Use pain medications as prescribed by your surgeon.  Take the pain medication with some food, when possible, to minimize side effects.  Intermittent use of ice packs at the incision sites may help during the first 48 hours.  Expect gradual improvement.    DIET:  No restrictions.  Drink plenty of fluids.  While taking pain medications, increase dietary fiber or add a fiber supplementation like Metamucil or Citrucel to help prevent constipation - a possible side effect of pain medications.    NAUSEA:  If nauseated from the anesthetic/pain meds; rest in bed, get up cautiously with assistance, and drink clear liquids (juice, tea, broth).    RETURN APPOINTMENT:  Schedule a follow-up visit 2-3 weeks post-op.  Office Phone:  [phone]     CONTACT US IF THE FOLLOWING  DEVELOPS:   1. A fever that is above 101     2. If there is a large amount of drainage, bleeding, or swelling.   3. Severe pain that is not relieved by your prescription.   4. Drainage that is thick, cloudy, yellow, green or white.   5. Any other questions not answered by  Frequently Asked Questions  sheet.      FREQUENTLY ASKED QUESTIONS:    Q:  How should my incision look?    A:  Normally your incision will appear slightly swollen with light redness directly along the incision itself as it heals.  It may feel like a bump or ridge as the healing/scarring happens, and over time (3-4 months) this bump or ridge feeling should slowly go away.  In general, clear or pink watery drainage can be normal at first as your incision heals, but should decrease over time.    Q:  How do I know if my incision is infected?  A:  Look at your incision for signs of infection, like redness around the incision spreading to surrounding skin, or drainage of cloudy or foul-smelling drainage.  If you feel warm, check your temperature to see if you are running a fever.    **If any of these things occur, please notify the nurse at our office.  We may need you to come into the office for an incision check.      Q:  How do I take care of my incision?  A:  If you have a dressing in place - Starting the day after surgery, replace the dressing 1-2 times a day until there is no further drainage from the incision.  At that time, a dressing is no longer needed.  Try to minimize tape on the skin if irritation is occurring at the tape sites.  If you have significant irritation from tape on the skin, please call the office to discuss other method of dressing your incision.    Small pieces of tape called  steri-strips  may be present directly overlying your incision; these may be removed 10 days after surgery unless otherwise specified by your surgeon.  If these tapes start to loosen at the ends, you may trim them back until they fall off or are removed.     A:  If you had  Dermabond  tissue glue used as a dressing (this causes your incision to look shiny with a clear covering over it) - This type of dressing wears off with time and does not require more dressings over the top unless it is draining around the glue as it wears off.  Do not apply ointments or lotions over the incisions until the glue has completely worn off.    Q:  There is a piece of tape or a sticky  lead  still on my skin.  Can I remove this?  A:  Sometimes the sticky  leads  used for monitoring during surgery or for evaluation in the emergency department are not all removed while you are in the hospital.  These sometimes have a tab or metal dot on them.  You can easily remove these on your own, like taking off a band-aid.  If there is a gel substance under the  lead , simply wipe/clean it off with a washcloth or paper towel.      Q:  What can I do to minimize constipation (very hard stools, or lack of stools)?  A:  Stay well hydrated.  Increase your dietary fiber intake or take a fiber supplement -with plenty of water.  Walk around frequently.  You may consider an over-the-counter stool-softener.  Your Pharmacist can assist you with choosing one that is stocked at your pharmacy.  Constipation is also one of the most common side effects of pain medication.  If you are using pain medication, be pro-active and try to PREVENT problems with constipation by taking the steps above BEFORE constipation becomes a problem.    Q:  What do I do if I need more pain medications?  A:  Call the office to receive refills.  Be aware that certain pain meds cannot be called into a pharmacy and actually require a paper prescription.  A change may be made in your pain med as you progress thru your recovery period or if you have side effects to certain meds.    --Pain meds are NOT refilled after 5pm on weekdays, and NOT AT ALL on the weekends, so please look ahead to prevent problems.      Q:  Why am I having a hard time  sleeping now that I am at home?  A:  Many medications you receive while you are in the hospital can impact your sleep for a number of days after your surgery/hospitalization.  Decreased level of activity and naps during the day may also make sleeping at night difficult.  Try to minimize day-time naps, and get up frequently during the day to walk around your home during your recovery time.  Sleep aides may be of some help, but are not recommended for long-term use.      Q:  I am having some back discomfort.  What should I do?  A:  This may be related to certain positioning that was required for your surgery, extended periods of time in bed, or other changes in your overall activity level.  You may try ice, heat, acetaminophen, or ibuprofen to treat this temporarily.  Note that many pain medications have acetaminophen in them and would state this on the prescription bottle.  Be sure not to exceed the maximum of 4000mg per day of acetaminophen.     **If the pain you are having does not resolve, is severe, or is a flare of back pain you have had on other occasions prior to surgery, please contact your primary physician for further recommendations or for an appointment to be examined at their office.    Q:  Why am I having headaches?  A:  Headaches can be caused by many things:  caffeine withdrawal, use of pain meds, dehydration, high blood pressure, lack of sleep, over-activity/exhaustion, flare-up of usual migraine headaches.  If you feel this is related to muscle tension (a band-like feeling around the head, or a pressure at the low-back of the head) you may try ice or heat to this area.  You may need to drink more fluids (try electrolyte drink like Gatorade), rest, or take your usual migraine medications.   **If your headaches do not resolve, worsen, are accompanied by other symptoms, or if your blood pressure is high, please call your primary physician for recommendation and/or examination.    Q:  I am unable to  urinate.  What do I do?  A:  A small percentage of people can have difficulty urinating initially after surgery.  This includes being able to urinate only a very small amount at a time and feeling discomfort or pressure in the very low abdomen.  This is called  urinary retention , and is actually an urgent situation.  Proceed to your nearest Emergency department for evaluation (not an Urgent Care Center).  Sometimes the bladder does not work correctly after certain medications you receive during surgery, or related to certain procedures.  You may need to have a catheter placed until your bladder recovers.  When planning to go to an Emergency department, it may help to call the ER to let them know you are coming in for this problem after a surgery.  This may help you get in quicker to be evaluated.  **If you have symptoms of a urinary tract infection, please contact your primary physician for the proper evaluation and treatment.          If you have other questions, please call the office Monday thru Friday between 8am and 5pm to discuss with the nurse or physician assistant.  #[phone]    There is a surgeon ON CALL on weekday evenings and over the weekend in case of urgent need only, and may be contacted at the same number.    If you are having an emergency, call 911 or proceed to your nearest emergency department.

## 2019-06-09 NOTE — PLAN OF CARE
VSS.  Pain controlled with po tylenol. Ambulating in halls independently.  Tolerating regular diet. No nausea.  Passing gas.  Discharged home to self care.  Discharge instructions given; all questions answered.  Aware of follow up recommendations.

## 2019-06-09 NOTE — PROGRESS NOTES
Feels well.  Tired.  Passing flatus.    Afebrile.    Incisions OK.    Doing well.  Home today.  Augmentin 875 mg x 7 d.    F/U 2-3 weeks.    Dino Varner MD  [facility]

## 2019-06-10 LAB — COPATH REPORT: NORMAL

## 2019-06-11 NOTE — DISCHARGE SUMMARY
[facility]    Discharge Summary  Surgery    Date of Admission:  6/6/2019  Date of Discharge:  6/9/2019 12:00 PM  Discharging Provider: Dino Varner MD  Discharge Summary Note completed by: Maribel Nickerson PA-C on 6/11/2019  Date of Service: The patient was personally seen by Discharging Providers on the day of discharge.    Discharge Diagnoses   Active Problems:    Acute perforated appendicitis      Procedure/Surgery Information   Procedure(s):  APPENDECTOMY, LAPAROSCOPIC drainage of right fallopian tube cyst   Surgeon(s) and Role:     * Dino Varner MD - Primary     Specimens: ID Type Source Tests Collected by Time Destination   A : appendix Tissue Appendix SURGICAL PATHOLOGY EXAM Dino Varner MD 6/6/2019 10:29 PM       Non-operative procedures: None performed       History of Present Illness   Omer Quezada is a 39 year old female who presented with  a 1 day history of lower abdominal pain.  She was seen at an outside emergency room and a CT scan was performed.  This showed fluid in the pelvis and a prominent appendix concerning for possible appendicitis.  Patient was transferred here to the hospital for appendectomy.        Hospital Course   Omer Quezada was admitted on 6/6/2019.  The following problems were addressed during her hospitalization:  Patient Active Problem List   Diagnosis             Acute perforated appendicitis       Mirtha-operative antibiotic therapy included: Zosyn.  Post-operative pain control: was via IV until able to tolerate PO intake and transitioned to PO pain meds.    Remarkable hospital course events: patient experienced postop ileus as expected.     Omer met all criteria for release on 6/9/2019 12:00 PM.  She was afebrile, tolerating diet, pain controlled on PO meds, ambulating well, and had return of bowel function.    Medications discontinued or adjusted during this hospitalization: see discharge med list below.    Antibiotics prescribed  at discharge: Augmentin, Duration: 7 days     Imaging study follow up needs:   -No studies require specific follow-up    Discharge Instructions and Follow-Up:  Discharge diet: Regular   Discharge activity: Lifting restricted to 25 pounds   Discharge follow-up: Follow up with Dr. Varner's PA in 1-3 weeks   Wound/Incision care: May get incision wet in shower but do not soak or scrub       Maribel Nickerson PA-C      Discharge Disposition   Discharged to home   Condition at discharge: Good    Pending Results   Final pathology results: acute appendicitis with necrosis and perforation  Unresulted Labs Ordered in the Past 30 Days of this Admission     No orders found from 4/7/2019 to 6/7/2019.          Primary Care Physician   Ashley Quintana    Consultations This Hospital Stay   None    Discharge Orders   No discharge procedures on file.  Discharge Medications   Discharge Medication List as of 6/9/2019 11:05 AM      START taking these medications    Details   amoxicillin-clavulanate (AUGMENTIN) 875-125 MG tablet Take 1 tablet by mouth 2 times daily for 7 days, Disp-14 tablet, R-0, E-Prescribe         CONTINUE these medications which have NOT CHANGED    Details   multivitamin w/minerals (THERA-VIT-M) tablet Take 1 tablet by mouth daily, Historical           Allergies   No Known Allergies  Data   Most Recent 3 CBC's:  Recent Labs   Lab Test 06/09/19  0722 06/07/19  0656    191      Most Recent 3 BMP's:  Recent Labs   Lab Test 06/07/19  0656   CR 0.64   *     Most Recent 2 LFT's:No lab results found.  Most Recent INR's and Anticoagulation Dosing History:  Anticoagulation Dose History     There is no flowsheet data to display.        Most Recent 3 Troponin's:No lab results found.  Most Recent Cholesterol Panel:No lab results found.  Most Recent 6 Bacteria Isolates From Any Culture (See EPIC Reports for Culture Details):No lab results found.  Most Recent TSH, T4 and A1c Labs:No lab results found.  No results  found for this or any previous visit.

## 2019-06-12 ENCOUNTER — TELEPHONE (OUTPATIENT)
Dept: SURGERY | Facility: CLINIC | Age: 39
End: 2019-06-12

## 2019-06-12 NOTE — TELEPHONE ENCOUNTER
S/p lap barbie, 6/6/19.  Surgeon:  Dr. Varner    Pt called to report rash covering her abdomen, rash extends from below her breasts to pubis.  She describes small pinpoint red bumpy rash, slightly itchy.  Rash does not extend around to back.   She reports she has been wearing an abdominal binder and wondering if this could be what is causing rash. She was sent home with 7 days of Augmentin - she is taking.      Reassured pt that this could be caused by surgical skin prep.  She will shower with warm soapy water careful to avoid scrubbing at incision sites.  She can use OTC Benadryl or Zyrtec.  If itching is bothersome she can try OTC hydrocortisone cream, avoiding incision sites. Ok to wear binder for comfort prn with t-shirt underneath.  Continue Abx as prescribed. She has a post-op appointment scheduled on 6/21/19.     She will call if worsening or new symptoms.

## 2019-06-21 ENCOUNTER — OFFICE VISIT (OUTPATIENT)
Dept: SURGERY | Facility: CLINIC | Age: 39
End: 2019-06-21
Payer: COMMERCIAL

## 2019-06-21 VITALS
HEIGHT: 66 IN | WEIGHT: 143 LBS | SYSTOLIC BLOOD PRESSURE: 100 MMHG | HEART RATE: 73 BPM | OXYGEN SATURATION: 100 % | RESPIRATION RATE: 16 BRPM | DIASTOLIC BLOOD PRESSURE: 62 MMHG | BODY MASS INDEX: 22.98 KG/M2

## 2019-06-21 DIAGNOSIS — Z09 SURGICAL FOLLOWUP VISIT: Primary | ICD-10-CM

## 2019-06-21 PROCEDURE — 99024 POSTOP FOLLOW-UP VISIT: CPT | Performed by: PHYSICIAN ASSISTANT

## 2019-06-21 ASSESSMENT — MIFFLIN-ST. JEOR: SCORE: 1345.39

## 2019-06-21 NOTE — LETTER
2019    RE: Omer Quezada, : 1980      [facility] Note   Subjective:  Omer Quezada is here for postoperative visit.  She underwent laparoscopic appendectomy and drainage of bilateral fallopian tube cysts by Dr. Varner.  She remained inpatient for IV antibiotics and for bowel recovery and discharged home on POD#3 with additional 7day course of Augmentin.  She is now 2 weeks postop, and feels her recovery has been progressing nicely.  Rx/OTC pain medication was used appropriately postop.  Postop recovery complications: None.     Today she has minimal discomfort at the umbilical site with activities, tolerating a regular diet, and having normal bowel activity.  Current pain management: none.  Following restrictions outlined by surgeon.     Objective:  Abd - soft, non-tender, non-distended  Laparoscopic incisions - steri-strips removed, healing well, no erythema/bruising, +normal healing ridges/density, no seroma/hematoma noted, no hernia noted     Assessment:  S/p laparoscopic appendectomy. Pathology showed acute appendicitis with perforation.     Plan:  Omer may continue to slowly advance her activities at this time.  General recommendation is to remain at a 30 lb weight restriction until 3 weeks after surgery.  After that time, she may increase activity as tolerated.  She may continue to utilize OTC pain management options as well as use of ice/heat to sites for comfort.  She should expect progressive resolution of the healing ridge along the incisional sites over the following 2-3 months.       Omer is recommended to contact the office if worsening pain, onset of fever/redness at any inc site, or new drainage from the area.  Pt also recommended to call office at any time if ongoing questions/concerns during recovery, but otherwise may follow-up on a prn basis.  Pt is in agreement with this plan.     Codi Irby PA-C

## 2019-06-21 NOTE — PROGRESS NOTES
[facility] Clinic Note   Subjective:  Omer Quezada is here for postoperative visit.  She underwent laparoscopic appendectomy and drainage of bilateral fallopian tube cysts by Dr. Varner.  She remained inpatient for IV antibiotics and for bowel recovery and discharged home on POD#3 with additional 7day course of Augmentin.  She is now 2 weeks postop, and feels her recovery has been progressing nicely.  Rx/OTC pain medication was used appropriately postop.  Postop recovery complications: None.    Today she has minimal discomfort at the umbilical site with activities, tolerating a regular diet, and having normal bowel activity.  Current pain management: none.  Following restrictions outlined by surgeon.    Objective:  Abd - soft, non-tender, non-distended  Laparoscopic incisions - steri-strips removed, healing well, no erythema/bruising, +normal healing ridges/density, no seroma/hematoma noted, no hernia noted    Assessment:  S/p laparoscopic appendectomy. Pathology showed acute appendicitis with perforation.    Plan:  Omer may continue to slowly advance her activities at this time.  General recommendation is to remain at a 30 lb weight restriction until 3 weeks after surgery.  After that time, she may increase activity as tolerated.  She may continue to utilize OTC pain management options as well as use of ice/heat to sites for comfort.  She should expect progressive resolution of the healing ridge along the incisional sites over the following 2-3 months.      Omer is recommended to contact the office if worsening pain, onset of fever/redness at any inc site, or new drainage from the area.  Pt also recommended to call office at any time if ongoing questions/concerns during recovery, but otherwise may follow-up on a prn basis.  Pt is in agreement with this plan.    Codi Irby PA-C      Please route or send letter to:  Primary Care Provider (PCP) -Mirna Rice NP, [facility]

## 2019-11-08 ENCOUNTER — HEALTH MAINTENANCE LETTER (OUTPATIENT)
Age: 39
End: 2019-11-08

## 2020-02-23 ENCOUNTER — HEALTH MAINTENANCE LETTER (OUTPATIENT)
Age: 40
End: 2020-02-23

## 2020-12-06 ENCOUNTER — HEALTH MAINTENANCE LETTER (OUTPATIENT)
Age: 40
End: 2020-12-06

## 2021-09-25 ENCOUNTER — HEALTH MAINTENANCE LETTER (OUTPATIENT)
Age: 41
End: 2021-09-25

## 2022-01-15 ENCOUNTER — HEALTH MAINTENANCE LETTER (OUTPATIENT)
Age: 42
End: 2022-01-15

## 2023-04-22 ENCOUNTER — HEALTH MAINTENANCE LETTER (OUTPATIENT)
Age: 43
End: 2023-04-22

## 2024-02-10 ENCOUNTER — HEALTH MAINTENANCE LETTER (OUTPATIENT)
Age: 44
End: 2024-02-10

## (undated) DEVICE — ESU PENCIL W/HOLSTER E2350H

## (undated) DEVICE — ESU ELEC BLADE 2.75" COATED/INSULATED E1455

## (undated) DEVICE — SUCTION IRR STRYKERFLOW II W/TIP 250-070-520

## (undated) DEVICE — ESU CORD MONOPOLAR 10'  E0510

## (undated) DEVICE — BAG CLEAR TRASH 1.3M 39X33" P4040C

## (undated) DEVICE — NDL 22GA 1.5"

## (undated) DEVICE — ENDO TROCAR OPTICAL ACCESS KII Z-THRD 12X100MM C0R85

## (undated) DEVICE — LINEN HALF SHEET 5512

## (undated) DEVICE — SOL NACL 0.9% IRRIG 1000ML BOTTLE 2F7124

## (undated) DEVICE — ENDO TROCAR FIRST ENTRY KII FIOS Z-THRD 05X100MM CTF03

## (undated) DEVICE — SU VICRYL 0 CT-2 CR 8X18" J727D

## (undated) DEVICE — ENDO POUCH UNIV RETRIEVAL SYSTEM INZII 10MM CD001

## (undated) DEVICE — SUCTION CANISTER MEDIVAC LINER 3000ML W/LID 65651-530

## (undated) DEVICE — DECANTER VIAL 2006S

## (undated) DEVICE — SOL NACL 0.9% INJ 1000ML BAG 2B1324X

## (undated) DEVICE — SU VICRYL 4-0 P-3 18" UND J494G

## (undated) DEVICE — BLADE CLIPPER 3M 9670

## (undated) DEVICE — Device

## (undated) DEVICE — GLOVE PROTEXIS POWDER FREE 8.0 ORTHOPEDIC 2D73ET80

## (undated) DEVICE — LINEN FULL SHEET 5511

## (undated) DEVICE — ESU GROUND PAD ADULT W/CORD E7507

## (undated) DEVICE — STPL ENDO RELOAD ECHELON 45X2.0MM GREY ECR45M

## (undated) DEVICE — LINEN TOWEL PACK X10 5473

## (undated) DEVICE — ENDO TROCAR SLEEVE KII Z-THREADED 05X100MM CTS02

## (undated) DEVICE — LINEN POUCH DBL 5427

## (undated) DEVICE — GLOVE PROTEXIS POWDER FREE SMT 7.5  2D72PT75X

## (undated) DEVICE — SOL ADH LIQUID BENZOIN SWAB 0.6ML C1544

## (undated) RX ORDER — FENTANYL CITRATE 50 UG/ML
INJECTION, SOLUTION INTRAMUSCULAR; INTRAVENOUS
Status: DISPENSED
Start: 2019-06-06